# Patient Record
Sex: FEMALE | Race: WHITE | Employment: FULL TIME | URBAN - METROPOLITAN AREA
[De-identification: names, ages, dates, MRNs, and addresses within clinical notes are randomized per-mention and may not be internally consistent; named-entity substitution may affect disease eponyms.]

---

## 2023-08-26 DIAGNOSIS — Z00.00 WELL ADULT EXAM: ICD-10-CM

## 2023-08-26 LAB
ALBUMIN SERPL-MCNC: 4.6 G/DL (ref 3.4–5)
ALBUMIN/GLOB SERPL: 1.5 {RATIO} (ref 1.1–2.2)
ALP SERPL-CCNC: 113 U/L (ref 40–129)
ALT SERPL-CCNC: 55 U/L (ref 10–40)
ANION GAP SERPL CALCULATED.3IONS-SCNC: 15 MMOL/L (ref 3–16)
AST SERPL-CCNC: 25 U/L (ref 15–37)
BASOPHILS # BLD: 0.1 K/UL (ref 0–0.2)
BASOPHILS NFR BLD: 0.7 %
BILIRUB SERPL-MCNC: <0.2 MG/DL (ref 0–1)
BUN SERPL-MCNC: 8 MG/DL (ref 7–20)
CALCIUM SERPL-MCNC: 9.8 MG/DL (ref 8.3–10.6)
CHLORIDE SERPL-SCNC: 102 MMOL/L (ref 99–110)
CHOLEST SERPL-MCNC: 193 MG/DL (ref 0–199)
CO2 SERPL-SCNC: 24 MMOL/L (ref 21–32)
CREAT SERPL-MCNC: 0.7 MG/DL (ref 0.6–1.1)
DEPRECATED RDW RBC AUTO: 15.2 % (ref 12.4–15.4)
EOSINOPHIL # BLD: 0.1 K/UL (ref 0–0.6)
EOSINOPHIL NFR BLD: 1.5 %
EST. AVERAGE GLUCOSE BLD GHB EST-MCNC: 114 MG/DL
GFR SERPLBLD CREATININE-BSD FMLA CKD-EPI: >60 ML/MIN/{1.73_M2}
GLUCOSE SERPL-MCNC: 107 MG/DL (ref 70–99)
HBA1C MFR BLD: 5.6 %
HCT VFR BLD AUTO: 40.5 % (ref 36–48)
HDLC SERPL-MCNC: 43 MG/DL (ref 40–60)
HGB BLD-MCNC: 13.6 G/DL (ref 12–16)
LDLC SERPL CALC-MCNC: 125 MG/DL
LYMPHOCYTES # BLD: 2.5 K/UL (ref 1–5.1)
LYMPHOCYTES NFR BLD: 31.3 %
MCH RBC QN AUTO: 27.4 PG (ref 26–34)
MCHC RBC AUTO-ENTMCNC: 33.6 G/DL (ref 31–36)
MCV RBC AUTO: 81.4 FL (ref 80–100)
MONOCYTES # BLD: 0.5 K/UL (ref 0–1.3)
MONOCYTES NFR BLD: 6.2 %
NEUTROPHILS # BLD: 4.8 K/UL (ref 1.7–7.7)
NEUTROPHILS NFR BLD: 60.3 %
PLATELET # BLD AUTO: 370 K/UL (ref 135–450)
PMV BLD AUTO: 8.4 FL (ref 5–10.5)
POTASSIUM SERPL-SCNC: 4.3 MMOL/L (ref 3.5–5.1)
PROT SERPL-MCNC: 7.6 G/DL (ref 6.4–8.2)
RBC # BLD AUTO: 4.98 M/UL (ref 4–5.2)
SODIUM SERPL-SCNC: 141 MMOL/L (ref 136–145)
TRIGL SERPL-MCNC: 125 MG/DL (ref 0–150)
TSH SERPL DL<=0.005 MIU/L-ACNC: 1.5 UIU/ML (ref 0.27–4.2)
VLDLC SERPL CALC-MCNC: 25 MG/DL
WBC # BLD AUTO: 7.9 K/UL (ref 4–11)

## 2023-09-28 PROBLEM — Z12.31 ENCOUNTER FOR SCREENING MAMMOGRAM FOR MALIGNANT NEOPLASM OF BREAST: Status: ACTIVE | Noted: 2023-09-28

## 2023-09-28 PROBLEM — I83.92 VARICOSE VEINS OF LEFT LOWER EXTREMITY: Status: ACTIVE | Noted: 2023-09-28

## 2023-10-13 ENCOUNTER — HOSPITAL ENCOUNTER (OUTPATIENT)
Dept: MAMMOGRAPHY | Age: 43
Discharge: HOME | End: 2023-10-13
Payer: COMMERCIAL

## 2023-10-13 ENCOUNTER — OFFICE VISIT (OUTPATIENT)
Dept: GYNECOLOGY | Age: 43
End: 2023-10-13
Payer: COMMERCIAL

## 2023-10-13 VITALS
OXYGEN SATURATION: 99 % | WEIGHT: 226 LBS | SYSTOLIC BLOOD PRESSURE: 136 MMHG | HEART RATE: 79 BPM | BODY MASS INDEX: 45.65 KG/M2 | DIASTOLIC BLOOD PRESSURE: 84 MMHG

## 2023-10-13 VITALS — HEIGHT: 59 IN | WEIGHT: 225 LBS | BODY MASS INDEX: 45.36 KG/M2

## 2023-10-13 DIAGNOSIS — Z01.419 WELL WOMAN EXAM WITH ROUTINE GYNECOLOGICAL EXAM: Primary | ICD-10-CM

## 2023-10-13 DIAGNOSIS — Z12.31 VISIT FOR SCREENING MAMMOGRAM: ICD-10-CM

## 2023-10-13 DIAGNOSIS — N92.0 MENORRHAGIA WITH REGULAR CYCLE: ICD-10-CM

## 2023-10-13 PROCEDURE — 77063 BREAST TOMOSYNTHESIS BI: CPT

## 2023-10-13 PROCEDURE — 99386 PREV VISIT NEW AGE 40-64: CPT | Performed by: OBSTETRICS & GYNECOLOGY

## 2023-10-13 NOTE — PROGRESS NOTES
SUBJECTIVE:  Judie Valerio is an 37y.o. year old woman who presents for annual gyn exam.    She complains of heavy menstrual cycles. Recent blood work from her primary care physician shows a normal blood count, no anemia. REVIEW OF SYSTEMS:  No complaints of symptoms involving:  Constitutional: there has been no unanticipated weight loss. There's been no change in activity level. Negative for fever, chills. Eyes: No visual changes, double vision, or scotomata. No scleral icterus. HENT: No Headaches, hearing loss or vertigo. No sore throat, ear pain or nasal congestion  Respiratory: no cough or wheezing, no sputum production, no hemoptysis. Gastrointestinal: No abdominal pain, appetite loss, blood in stools. No change in bowel habits. Genitourinary: No dysuria, trouble voiding, or hematuria. No change in bladder habits. Musculoskeletal:  No gait disturbance,no weakness or joint complaints. Skin: No rash or pruritis. Neurological: No headache, vision changes, change in muscle strength, numbness or tingling. No change in gait, balance, coordination. Psychiatric: No anxiety, or depression. No change in mood or behavior. Endocrine: No temperature intolerance. No excessive thirst, fluid intake, or urination. No tremor. Hematologic/Lymphatic: No abnormal bruising or bleeding, blood clots or swollen lymph nodes. Patient Active Problem List   Diagnosis    Gestational diabetes    Obesity (BMI 30-39. 9)    Pharyngitis    Elevated blood pressure reading    Encounter for screening mammogram for malignant neoplasm of breast    Varicose veins of left lower extremity     No current outpatient medications on file. No current facility-administered medications for this visit.      Past Medical History:   Diagnosis Date    Gestational diabetes mellitus      Past Surgical History:   Procedure Laterality Date     SECTION       Social History     Tobacco Use    Smoking status: Never    Smokeless

## 2023-10-13 NOTE — PROGRESS NOTES
The sensitive parts of the examination were performed with Jovan Jacinto MA as a chaperone. Fernanda Castillo was present during the entirety of the sensitive parts of the examination.

## 2023-10-17 ENCOUNTER — TELEPHONE (OUTPATIENT)
Dept: GYNECOLOGY | Age: 43
End: 2023-10-17

## 2023-10-17 LAB
HPV HR 12 DNA SPEC QL NAA+PROBE: DETECTED
HPV16 DNA SPEC QL NAA+PROBE: NOT DETECTED
HPV16+18+H RISK 12 DNA SPEC-IMP: ABNORMAL
HPV18 DNA SPEC QL NAA+PROBE: NOT DETECTED

## 2023-10-17 NOTE — TELEPHONE ENCOUNTER
Pt called in for her pap results. She was advised that the dr has not had a moment to look at it. Once he does, we will reach out to her.  Pt understood    Thank you

## 2023-10-28 PROBLEM — Z12.31 ENCOUNTER FOR SCREENING MAMMOGRAM FOR MALIGNANT NEOPLASM OF BREAST: Status: RESOLVED | Noted: 2023-09-28 | Resolved: 2023-10-28

## 2025-01-31 ENCOUNTER — OFFICE VISIT (OUTPATIENT)
Dept: GYNECOLOGY | Age: 45
End: 2025-01-31
Payer: COMMERCIAL

## 2025-01-31 VITALS
HEIGHT: 59 IN | WEIGHT: 226 LBS | SYSTOLIC BLOOD PRESSURE: 128 MMHG | BODY MASS INDEX: 45.56 KG/M2 | DIASTOLIC BLOOD PRESSURE: 86 MMHG | OXYGEN SATURATION: 100 % | HEART RATE: 88 BPM

## 2025-01-31 DIAGNOSIS — Z01.419 WELL WOMAN EXAM WITH ROUTINE GYNECOLOGICAL EXAM: Primary | ICD-10-CM

## 2025-01-31 DIAGNOSIS — N92.0 MENORRHAGIA WITH REGULAR CYCLE: ICD-10-CM

## 2025-01-31 DIAGNOSIS — N39.3 SUI (STRESS URINARY INCONTINENCE, FEMALE): ICD-10-CM

## 2025-01-31 PROCEDURE — 99396 PREV VISIT EST AGE 40-64: CPT | Performed by: OBSTETRICS & GYNECOLOGY

## 2025-02-07 NOTE — PROGRESS NOTES
SUBJECTIVE:  Colleen Leija is an 44 y.o. year old woman who presents for annual gyn exam.    Reports typical stress urinary incontinence.  Reports menorrhagia with heavy menstrual cycles.  Regular cycles.  Not interested in further evaluation or treatment at this time.    REVIEW OF SYSTEMS:  No complaints of symptoms involving:  Constitutional: there has been no unanticipated weight loss. There's been no change in activity level. Negative for fever, chills.  Eyes: No visual changes, double vision, or scotomata. No scleral icterus.  HENT: No Headaches, hearing loss or vertigo. No sore throat, ear pain or nasal congestion  Respiratory: no cough or wheezing, no sputum production, no hemoptysis.    Gastrointestinal: No abdominal pain, appetite loss, blood in stools. No change in bowel habits.  Genitourinary: No dysuria, trouble voiding, or hematuria. No change in bladder habits.  Musculoskeletal:  No gait disturbance,no weakness or joint complaints.  Skin: No rash or pruritis.  Neurological: No headache, vision changes, change in muscle strength, numbness or tingling. No change in gait, balance, coordination.  Psychiatric: No anxiety, or depression. No change in mood or behavior.  Endocrine: No temperature intolerance. No excessive thirst, fluid intake, or urination. No tremor.  Hematologic/Lymphatic: No abnormal bruising or bleeding, blood clots or swollen lymph nodes.       Patient Active Problem List   Diagnosis    Gestational diabetes    Obesity (BMI 30-39.9)    Pharyngitis    Elevated blood pressure reading    Varicose veins of left lower extremity     No current outpatient medications on file.     No current facility-administered medications for this visit.     Past Medical History:   Diagnosis Date    Gestational diabetes mellitus      Past Surgical History:   Procedure Laterality Date     SECTION       Social History     Tobacco Use    Smoking status: Never    Smokeless tobacco: Never

## 2025-04-07 ENCOUNTER — TELEPHONE (OUTPATIENT)
Dept: INTERNAL MEDICINE CLINIC | Age: 45
End: 2025-04-07

## 2025-04-07 NOTE — TELEPHONE ENCOUNTER
Last time I see the patient was in 2023.  I would advise to come in first thing then I can decide on what labs to order

## 2025-04-17 ENCOUNTER — OFFICE VISIT (OUTPATIENT)
Dept: INTERNAL MEDICINE CLINIC | Age: 45
End: 2025-04-17
Payer: COMMERCIAL

## 2025-04-17 VITALS
SYSTOLIC BLOOD PRESSURE: 140 MMHG | OXYGEN SATURATION: 98 % | BODY MASS INDEX: 42.86 KG/M2 | HEIGHT: 61 IN | DIASTOLIC BLOOD PRESSURE: 108 MMHG | TEMPERATURE: 98.6 F | WEIGHT: 227 LBS | HEART RATE: 76 BPM

## 2025-04-17 DIAGNOSIS — Z00.00 ENCOUNTER FOR WELL ADULT EXAM WITHOUT ABNORMAL FINDINGS: Primary | ICD-10-CM

## 2025-04-17 DIAGNOSIS — I10 PRIMARY HYPERTENSION: ICD-10-CM

## 2025-04-17 DIAGNOSIS — Z12.31 BREAST CANCER SCREENING BY MAMMOGRAM: ICD-10-CM

## 2025-04-17 DIAGNOSIS — M54.31 RIGHT SIDED SCIATICA: ICD-10-CM

## 2025-04-17 DIAGNOSIS — E66.9 OBESITY (BMI 30-39.9): ICD-10-CM

## 2025-04-17 PROCEDURE — 3080F DIAST BP >= 90 MM HG: CPT | Performed by: STUDENT IN AN ORGANIZED HEALTH CARE EDUCATION/TRAINING PROGRAM

## 2025-04-17 PROCEDURE — 99396 PREV VISIT EST AGE 40-64: CPT | Performed by: STUDENT IN AN ORGANIZED HEALTH CARE EDUCATION/TRAINING PROGRAM

## 2025-04-17 PROCEDURE — 3077F SYST BP >= 140 MM HG: CPT | Performed by: STUDENT IN AN ORGANIZED HEALTH CARE EDUCATION/TRAINING PROGRAM

## 2025-04-17 SDOH — ECONOMIC STABILITY: FOOD INSECURITY: WITHIN THE PAST 12 MONTHS, THE FOOD YOU BOUGHT JUST DIDN'T LAST AND YOU DIDN'T HAVE MONEY TO GET MORE.: NEVER TRUE

## 2025-04-17 SDOH — ECONOMIC STABILITY: FOOD INSECURITY: WITHIN THE PAST 12 MONTHS, YOU WORRIED THAT YOUR FOOD WOULD RUN OUT BEFORE YOU GOT MONEY TO BUY MORE.: NEVER TRUE

## 2025-04-17 ASSESSMENT — ENCOUNTER SYMPTOMS
CONSTIPATION: 0
CHEST TIGHTNESS: 0
VOICE CHANGE: 0
CHOKING: 0
TROUBLE SWALLOWING: 0
VOMITING: 0
ABDOMINAL DISTENTION: 0
WHEEZING: 0
DIARRHEA: 0
APNEA: 0
SHORTNESS OF BREATH: 0
COUGH: 0
PHOTOPHOBIA: 0
ABDOMINAL PAIN: 0
STRIDOR: 0
NAUSEA: 0

## 2025-04-17 ASSESSMENT — PATIENT HEALTH QUESTIONNAIRE - PHQ9
SUM OF ALL RESPONSES TO PHQ QUESTIONS 1-9: 0
2. FEELING DOWN, DEPRESSED OR HOPELESS: NOT AT ALL
SUM OF ALL RESPONSES TO PHQ QUESTIONS 1-9: 0
1. LITTLE INTEREST OR PLEASURE IN DOING THINGS: NOT AT ALL

## 2025-04-17 NOTE — ASSESSMENT & PLAN NOTE
Within normal limits for age- cont to work no ADL issues,immunizations up to date, no depression ,no cognitive impairment  Colonoscopy never done, cologuard in 2023 repeat in 3 years in 2026  Mammogram last done in 2023, due now, ordered  Pap smear last done in 1/2025 up-to-date, per GYN  Eye exam up to date  Exercises as tolerated    Findings and recommendations discussed with Pt

## 2025-04-17 NOTE — PROGRESS NOTES
Well Adult Note  Name: Colleen Leija Today’s Date: 2025   MRN: [id number] Sex: Female   Age: 44 y.o. Ethnicity: Unavailable / Unknown   : 1980 Race: White (non-)      Colleen Leija is here for a well adult exam.  Chief Complaint   Patient presents with    Annual Exam     C/o right side sciatic pain and headaches that wake her up while sleeping every couple of weeks.           Assessment & Plan   Encounter for well adult exam without abnormal findings  Assessment & Plan:   Within normal limits for age- cont to work no ADL issues,immunizations up to date, no depression ,no cognitive impairment  Colonoscopy never done, cologuard in  repeat in 3 years in   Mammogram last done in , due now, ordered  Pap smear last done in 2025 up-to-date, per GYN  Eye exam up to date  Exercises as tolerated    Findings and recommendations discussed with Pt    Orders:  -     Hemoglobin A1C; Future  -     Lipid, Fasting; Future  -     TSH reflex to FT4; Future  -     Comprehensive Metabolic Panel; Future  -     CBC with Auto Differential; Future  Breast cancer screening by mammogram  -     FRANCOIS DARRION DIGITAL SCREEN BILATERAL; Future  -     Hemoglobin A1C; Future  -     Lipid, Fasting; Future  -     TSH reflex to FT4; Future  -     Comprehensive Metabolic Panel; Future  -     CBC with Auto Differential; Future  Right sided sciatica  Assessment & Plan:   Intermittent symptoms  Start exercise -home exercise provided  If no improvement, will consider to refer to PT  We also discussed weight loss    Primary hypertension  Assessment & Plan:  Reports increased stress at home - teenage daughter, work. HA could be due to high BP  Start Check BP at home twice daily - recheck in 4 weeks here in the office. If still high, will need to start meds  Discussed low sodium diet, exercise, weight loss.  Check lab  Obesity (BMI 30-39.9)  Assessment & Plan:  Discussed healthy diet and exercise        Return in about

## 2025-04-17 NOTE — ASSESSMENT & PLAN NOTE
Reports increased stress at home - teenage daughter, work. HA could be due to high BP  Start Check BP at home twice daily - recheck in 4 weeks here in the office. If still high, will need to start meds  Discussed low sodium diet, exercise, weight loss.  Check lab

## 2025-04-17 NOTE — ASSESSMENT & PLAN NOTE
Intermittent symptoms  Start exercise -home exercise provided  If no improvement, will consider to refer to PT

## 2025-04-18 DIAGNOSIS — Z12.31 BREAST CANCER SCREENING BY MAMMOGRAM: ICD-10-CM

## 2025-04-18 DIAGNOSIS — Z00.00 ENCOUNTER FOR WELL ADULT EXAM WITHOUT ABNORMAL FINDINGS: ICD-10-CM

## 2025-04-19 LAB
ALBUMIN SERPL-MCNC: 4.3 G/DL (ref 3.4–5)
ALBUMIN/GLOB SERPL: 1.5 {RATIO} (ref 1.1–2.2)
ALP SERPL-CCNC: 101 U/L (ref 40–129)
ALT SERPL-CCNC: 43 U/L (ref 10–40)
ANION GAP SERPL CALCULATED.3IONS-SCNC: 11 MMOL/L (ref 3–16)
AST SERPL-CCNC: 23 U/L (ref 15–37)
BASOPHILS # BLD: 0.1 K/UL (ref 0–0.2)
BASOPHILS NFR BLD: 1 %
BILIRUB SERPL-MCNC: 0.3 MG/DL (ref 0–1)
BUN SERPL-MCNC: 13 MG/DL (ref 7–20)
CALCIUM SERPL-MCNC: 9.4 MG/DL (ref 8.3–10.6)
CHLORIDE SERPL-SCNC: 103 MMOL/L (ref 99–110)
CHOLEST SERPL-MCNC: 202 MG/DL (ref 0–199)
CO2 SERPL-SCNC: 26 MMOL/L (ref 21–32)
CREAT SERPL-MCNC: 0.6 MG/DL (ref 0.6–1.1)
DEPRECATED RDW RBC AUTO: 13.8 % (ref 12.4–15.4)
EOSINOPHIL # BLD: 0.1 K/UL (ref 0–0.6)
EOSINOPHIL NFR BLD: 2 %
EST. AVERAGE GLUCOSE BLD GHB EST-MCNC: 114 MG/DL
GFR SERPLBLD CREATININE-BSD FMLA CKD-EPI: >90 ML/MIN/{1.73_M2}
GLUCOSE SERPL-MCNC: 109 MG/DL (ref 70–99)
HBA1C MFR BLD: 5.6 %
HCT VFR BLD AUTO: 41.3 % (ref 36–48)
HDLC SERPL-MCNC: 41 MG/DL (ref 40–60)
HGB BLD-MCNC: 13.6 G/DL (ref 12–16)
LDL CHOLESTEROL: 134 MG/DL
LYMPHOCYTES # BLD: 2.2 K/UL (ref 1–5.1)
LYMPHOCYTES NFR BLD: 29.4 %
MCH RBC QN AUTO: 27.9 PG (ref 26–34)
MCHC RBC AUTO-ENTMCNC: 33 G/DL (ref 31–36)
MCV RBC AUTO: 84.8 FL (ref 80–100)
MONOCYTES # BLD: 0.5 K/UL (ref 0–1.3)
MONOCYTES NFR BLD: 6.2 %
NEUTROPHILS # BLD: 4.5 K/UL (ref 1.7–7.7)
NEUTROPHILS NFR BLD: 61.4 %
PLATELET # BLD AUTO: 384 K/UL (ref 135–450)
PMV BLD AUTO: 8.9 FL (ref 5–10.5)
POTASSIUM SERPL-SCNC: 4.4 MMOL/L (ref 3.5–5.1)
PROT SERPL-MCNC: 7.2 G/DL (ref 6.4–8.2)
RBC # BLD AUTO: 4.87 M/UL (ref 4–5.2)
SODIUM SERPL-SCNC: 140 MMOL/L (ref 136–145)
TRIGL SERPL-MCNC: 133 MG/DL (ref 0–150)
TSH SERPL DL<=0.005 MIU/L-ACNC: 2.21 UIU/ML (ref 0.27–4.2)
VLDLC SERPL CALC-MCNC: 27 MG/DL
WBC # BLD AUTO: 7.3 K/UL (ref 4–11)

## 2025-04-22 ENCOUNTER — RESULTS FOLLOW-UP (OUTPATIENT)
Dept: INTERNAL MEDICINE CLINIC | Age: 45
End: 2025-04-22

## 2025-05-17 PROBLEM — Z00.00 ENCOUNTER FOR WELL ADULT EXAM WITHOUT ABNORMAL FINDINGS: Status: RESOLVED | Noted: 2025-04-17 | Resolved: 2025-05-17
